# Patient Record
Sex: FEMALE | URBAN - METROPOLITAN AREA
[De-identification: names, ages, dates, MRNs, and addresses within clinical notes are randomized per-mention and may not be internally consistent; named-entity substitution may affect disease eponyms.]

---

## 2024-10-03 ENCOUNTER — CLAIMS CREATED FROM THE CLAIM WINDOW (OUTPATIENT)
Dept: URBAN - METROPOLITAN AREA MEDICAL CENTER 25 | Facility: MEDICAL CENTER | Age: 54
End: 2024-10-03
Payer: COMMERCIAL

## 2024-10-03 DIAGNOSIS — R93.3 ABN FINDINGS-GI TRACT: ICD-10-CM

## 2024-10-03 DIAGNOSIS — R11.0 NAUSEA: ICD-10-CM

## 2024-10-03 PROCEDURE — 99222 1ST HOSP IP/OBS MODERATE 55: CPT | Performed by: INTERNAL MEDICINE

## 2024-10-03 PROCEDURE — G8427 DOCREV CUR MEDS BY ELIG CLIN: HCPCS | Performed by: INTERNAL MEDICINE

## 2024-10-03 PROCEDURE — 99254 IP/OBS CNSLTJ NEW/EST MOD 60: CPT | Performed by: INTERNAL MEDICINE

## 2024-10-10 ENCOUNTER — LAB OUTSIDE AN ENCOUNTER (OUTPATIENT)
Dept: URBAN - METROPOLITAN AREA CLINIC 19 | Facility: CLINIC | Age: 54
End: 2024-10-10

## 2024-10-14 ENCOUNTER — OFFICE VISIT (OUTPATIENT)
Dept: URBAN - METROPOLITAN AREA CLINIC 19 | Facility: CLINIC | Age: 54
End: 2024-10-14

## 2024-10-14 RX ORDER — FLUTICASONE PROPIONATE 50 UG/1
SHAKE LIQUID AND USE 1 SPRAY IN EACH NOSTRIL DAILY SPRAY, METERED NASAL
Qty: 16 GRAM | Refills: 0 | Status: ON HOLD | COMMUNITY

## 2024-10-14 RX ORDER — FLUTICASONE PROPIONATE 50 UG/1
SPRAY, METERED NASAL
Qty: 16 MILLILITER | Refills: 0 | Status: ACTIVE | COMMUNITY

## 2024-10-14 RX ORDER — DOXYCYCLINE 100 MG/1
TAKE 1 CAPSULE BY MOUTH TWICE DAILY FOR 10 DAYS CAPSULE ORAL
Qty: 20 EACH | Refills: 0 | Status: ACTIVE | COMMUNITY

## 2024-10-14 RX ORDER — FLUTICASONE PROPIONATE 50 UG/1
SHAKE LIQUID AND USE 1 SPRAY IN EACH NOSTRIL DAILY SPRAY, METERED NASAL
Qty: 16 GRAM | Refills: 0 | Status: ACTIVE | COMMUNITY

## 2024-10-14 RX ORDER — OXYCODONE HYDROCHLORIDE 5 MG/1
TABLET ORAL
Qty: 12 TABLET | Refills: 0 | Status: ACTIVE | COMMUNITY

## 2024-10-14 RX ORDER — DICLOFENAC SODIUM 75 MG/1
TAKE 1 TABLET BY MOUTH 2 TIMES A DAY X14 DAYS TABLET, DELAYED RELEASE ORAL
Qty: 28 EACH | Refills: 0 | Status: ACTIVE | COMMUNITY

## 2024-10-14 RX ORDER — PREDNISONE 50 MG/1
TABLET ORAL
Qty: 5 TABLET | Refills: 0 | Status: ACTIVE | COMMUNITY

## 2024-10-14 RX ORDER — CEFDINIR 300 MG/1
CAPSULE ORAL
Qty: 20 CAPSULE | Refills: 0 | Status: ACTIVE | COMMUNITY

## 2024-10-14 RX ORDER — FLUTICASONE PROPIONATE 50 UG/1
SPRAY, METERED NASAL
Qty: 16 MILLILITER | Refills: 0 | Status: ON HOLD | COMMUNITY

## 2024-10-14 RX ORDER — AMOXICILLIN AND CLAVULANATE POTASSIUM 875; 125 MG/1; MG/1
TAKE 1 TABLET BY MOUTH EVERY 12 HOURS FOR 7 DAYS TABLET, FILM COATED ORAL
Qty: 14 EACH | Refills: 0 | Status: ACTIVE | COMMUNITY

## 2024-10-14 RX ORDER — ALBUTEROL SULFATE 90 UG/1
INHALE 2 PUFFS BY MOUTH EVERY 4 TO 6 HOURS WITH SPACER AEROSOL, METERED RESPIRATORY (INHALATION)
Qty: 8.5 GRAM | Refills: 0 | Status: ACTIVE | COMMUNITY

## 2024-10-14 RX ORDER — OXYCODONE HYDROCHLORIDE 5 MG/1
TAKE 1 TABLET BY MOUTH EVERY 4 HOURS AS NEEDED FOR MODERATE PAIN TABLET ORAL
Qty: 12 EACH | Refills: 0 | Status: ACTIVE | COMMUNITY

## 2024-10-14 RX ORDER — SACCHAROMYCES BOULARDII 50 MG
TAKE 1 CAPSULE BY MOUTH TWICE DAILY FOR 7 DAYS CAPSULE ORAL
Qty: 50 EACH | Refills: 0 | Status: ACTIVE | COMMUNITY

## 2024-10-14 RX ORDER — METHYLPREDNISOLONE 4 MG/1
FOLLOW PACKAGE DIRECTIONS TABLET ORAL
Qty: 21 EACH | Refills: 0 | Status: ACTIVE | COMMUNITY

## 2024-10-14 RX ORDER — SACCHAROMYCES BOULARDII 50 MG
CAPSULE ORAL
Qty: 50 CAPSULE | Refills: 0 | Status: ACTIVE | COMMUNITY

## 2024-10-14 RX ORDER — INHALER,ASSIST DEVICE,LG MASK
SPACER (EA) MISCELLANEOUS
Qty: 1 EACH | Refills: 0 | Status: ACTIVE | COMMUNITY

## 2024-10-14 RX ORDER — ONDANSETRON HYDROCHLORIDE 4 MG/1
TAKE 1 TABLET BY MOUTH EVERY 8 HOURS AS NEEDED FOR NAUSEA OR VOMITING TABLET, FILM COATED ORAL
Qty: 15 EACH | Refills: 0 | Status: ACTIVE | COMMUNITY

## 2024-10-14 RX ORDER — ONDANSETRON HYDROCHLORIDE 4 MG/1
TABLET, FILM COATED ORAL
Qty: 15 TABLET | Refills: 0 | Status: ACTIVE | COMMUNITY

## 2024-10-14 RX ORDER — AMOXICILLIN AND CLAVULANATE POTASSIUM 875; 125 MG/1; MG/1
TABLET, FILM COATED ORAL
Qty: 14 TABLET | Refills: 0 | Status: ACTIVE | COMMUNITY

## 2024-10-14 RX ORDER — METHOCARBAMOL 750 MG/1
TAKE 1 TABLET BY MOUTH 3 TIMES A DAY FOR 5 DAYS TABLET, FILM COATED ORAL
Qty: 15 EACH | Refills: 0 | Status: ACTIVE | COMMUNITY

## 2024-10-14 RX ORDER — LEVOTHYROXINE SODIUM 0.05 MG/1
TAKE 1 TABLET BY MOUTH DAILY TABLET ORAL
Qty: 90 EACH | Refills: 1 | Status: ACTIVE | COMMUNITY

## 2024-10-14 RX ORDER — PANTOPRAZOLE SODIUM 40 MG/1
TABLET, DELAYED RELEASE ORAL
Qty: 60 TABLET | Refills: 0 | Status: ACTIVE | COMMUNITY

## 2024-10-14 RX ORDER — NITROFURANTOIN MONOHYDRATE/MACROCRYSTALLINE 25; 75 MG/1; MG/1
TAKE 1 CAPSULE BY MOUTH TWICE DAILY CAPSULE ORAL
Qty: 14 EACH | Refills: 0 | Status: ACTIVE | COMMUNITY

## 2024-10-14 RX ORDER — TRIAMTERENE AND HYDROCHLOROTHIAZIDE 37.5; 25 MG/1; MG/1
CAPSULE ORAL
Qty: 90 CAPSULE | Refills: 0 | Status: ACTIVE | COMMUNITY

## 2024-10-14 NOTE — HPI-TODAY'S VISIT:
54-year-old female with PMH of thyroid disease, and asthma presents today for hospital follow-up.  She was admitted to [facility] from 10/2/2024-10/4/24 with complaints of near syncope with associated nausea but no vomiting.  Stool culture positive for E. coli.  Her CT of the abdomen and pelvis noted sigmoidal diverticulitis as well as distal gastric wall thickening suspicious for peptic ulcer disease.  She was recommended to take pantoprazole 40 mg twice a day for 2 months for her gastric wall thickening which could represent PUD and given Augmentin x  **** NEED D/C SUMMARY***  Patient denies any chronic alcohol, tobacco or NSAID use.  She does admit eating "really spicy foods"  10/3/2024 labs: Normal CBC, low BUN 6, albumin 4.9 otherwise unremarkable CMP.

## 2024-10-15 ENCOUNTER — LAB OUTSIDE AN ENCOUNTER (OUTPATIENT)
Dept: URBAN - METROPOLITAN AREA CLINIC 19 | Facility: CLINIC | Age: 54
End: 2024-10-15

## 2024-10-17 ENCOUNTER — LAB OUTSIDE AN ENCOUNTER (OUTPATIENT)
Dept: URBAN - METROPOLITAN AREA CLINIC 19 | Facility: CLINIC | Age: 54
End: 2024-10-17

## 2024-10-17 ENCOUNTER — DASHBOARD ENCOUNTERS (OUTPATIENT)
Age: 54
End: 2024-10-17

## 2024-10-17 ENCOUNTER — OFFICE VISIT (OUTPATIENT)
Dept: URBAN - METROPOLITAN AREA CLINIC 19 | Facility: CLINIC | Age: 54
End: 2024-10-17
Payer: COMMERCIAL

## 2024-10-17 VITALS
WEIGHT: 229.8 LBS | HEART RATE: 72 BPM | TEMPERATURE: 97.5 F | SYSTOLIC BLOOD PRESSURE: 110 MMHG | DIASTOLIC BLOOD PRESSURE: 78 MMHG | BODY MASS INDEX: 36.93 KG/M2 | HEIGHT: 66 IN

## 2024-10-17 DIAGNOSIS — K31.89 GASTRIC WALL THICKENING: ICD-10-CM

## 2024-10-17 DIAGNOSIS — K57.92 DIVERTICULITIS: ICD-10-CM

## 2024-10-17 DIAGNOSIS — Z09 HOSPITAL DISCHARGE FOLLOW-UP: ICD-10-CM

## 2024-10-17 PROCEDURE — 99203 OFFICE O/P NEW LOW 30 MIN: CPT

## 2024-10-17 RX ORDER — AMOXICILLIN AND CLAVULANATE POTASSIUM 875; 125 MG/1; MG/1
TABLET, FILM COATED ORAL
Qty: 14 TABLET | Refills: 0 | Status: DISCONTINUED | COMMUNITY

## 2024-10-17 RX ORDER — PANTOPRAZOLE SODIUM 40 MG/1
TABLET, DELAYED RELEASE ORAL
Qty: 60 TABLET | Refills: 0 | Status: ACTIVE | COMMUNITY

## 2024-10-17 RX ORDER — SACCHAROMYCES BOULARDII 50 MG
CAPSULE ORAL
Qty: 50 CAPSULE | Refills: 0 | Status: DISCONTINUED | COMMUNITY

## 2024-10-17 RX ORDER — NITROFURANTOIN MONOHYDRATE/MACROCRYSTALLINE 25; 75 MG/1; MG/1
TAKE 1 CAPSULE BY MOUTH TWICE DAILY CAPSULE ORAL
Qty: 14 EACH | Refills: 0 | Status: ACTIVE | COMMUNITY

## 2024-10-17 RX ORDER — METHOCARBAMOL 750 MG/1
TAKE 1 TABLET BY MOUTH 3 TIMES A DAY FOR 5 DAYS TABLET, FILM COATED ORAL
Qty: 15 EACH | Refills: 0 | Status: ACTIVE | COMMUNITY

## 2024-10-17 RX ORDER — FLUTICASONE PROPIONATE 50 UG/1
SPRAY, METERED NASAL
Qty: 16 MILLILITER | Refills: 0 | Status: DISCONTINUED | COMMUNITY

## 2024-10-17 RX ORDER — LEVOTHYROXINE SODIUM 0.05 MG/1
TAKE 1 TABLET BY MOUTH DAILY TABLET ORAL
Qty: 90 EACH | Refills: 1 | Status: ACTIVE | COMMUNITY

## 2024-10-17 RX ORDER — SACCHAROMYCES BOULARDII 50 MG
TAKE 1 CAPSULE BY MOUTH TWICE DAILY FOR 7 DAYS CAPSULE ORAL
Qty: 50 EACH | Refills: 0 | Status: DISCONTINUED | COMMUNITY

## 2024-10-17 RX ORDER — ONDANSETRON HYDROCHLORIDE 4 MG/1
TAKE 1 TABLET BY MOUTH EVERY 8 HOURS AS NEEDED FOR NAUSEA OR VOMITING TABLET, FILM COATED ORAL
Qty: 15 EACH | Refills: 0 | Status: DISCONTINUED | COMMUNITY

## 2024-10-17 RX ORDER — OXYCODONE HYDROCHLORIDE 5 MG/1
TABLET ORAL
Qty: 12 TABLET | Refills: 0 | Status: DISCONTINUED | COMMUNITY

## 2024-10-17 RX ORDER — DOXYCYCLINE 100 MG/1
TAKE 1 CAPSULE BY MOUTH TWICE DAILY FOR 10 DAYS CAPSULE ORAL
Qty: 20 EACH | Refills: 0 | Status: DISCONTINUED | COMMUNITY

## 2024-10-17 RX ORDER — DICLOFENAC SODIUM 75 MG/1
TAKE 1 TABLET BY MOUTH 2 TIMES A DAY X14 DAYS TABLET, DELAYED RELEASE ORAL
Qty: 28 EACH | Refills: 0 | Status: DISCONTINUED | COMMUNITY

## 2024-10-17 RX ORDER — FLUTICASONE PROPIONATE 50 UG/1
SHAKE LIQUID AND USE 1 SPRAY IN EACH NOSTRIL DAILY SPRAY, METERED NASAL
Qty: 16 GRAM | Refills: 0 | Status: DISCONTINUED | COMMUNITY

## 2024-10-17 RX ORDER — PREDNISONE 50 MG/1
TABLET ORAL
Qty: 5 TABLET | Refills: 0 | Status: DISCONTINUED | COMMUNITY

## 2024-10-17 RX ORDER — INHALER,ASSIST DEVICE,LG MASK
SPACER (EA) MISCELLANEOUS
Qty: 1 EACH | Refills: 0 | Status: DISCONTINUED | COMMUNITY

## 2024-10-17 RX ORDER — AMOXICILLIN AND CLAVULANATE POTASSIUM 875; 125 MG/1; MG/1
TAKE 1 TABLET BY MOUTH EVERY 12 HOURS FOR 7 DAYS TABLET, FILM COATED ORAL
Qty: 14 EACH | Refills: 0 | Status: DISCONTINUED | COMMUNITY

## 2024-10-17 RX ORDER — OXYCODONE HYDROCHLORIDE 5 MG/1
TAKE 1 TABLET BY MOUTH EVERY 4 HOURS AS NEEDED FOR MODERATE PAIN TABLET ORAL
Qty: 12 EACH | Refills: 0 | Status: DISCONTINUED | COMMUNITY

## 2024-10-17 RX ORDER — TRIAMTERENE AND HYDROCHLOROTHIAZIDE 37.5; 25 MG/1; MG/1
CAPSULE ORAL
Qty: 90 CAPSULE | Refills: 0 | Status: ACTIVE | COMMUNITY

## 2024-10-17 RX ORDER — FLUTICASONE PROPIONATE 50 UG/1
SPRAY, METERED NASAL
Qty: 16 MILLILITER | Refills: 0 | Status: ON HOLD | COMMUNITY

## 2024-10-17 RX ORDER — ONDANSETRON HYDROCHLORIDE 4 MG/1
TABLET, FILM COATED ORAL
Qty: 15 TABLET | Refills: 0 | Status: DISCONTINUED | COMMUNITY

## 2024-10-17 RX ORDER — CEFDINIR 300 MG/1
CAPSULE ORAL
Qty: 20 CAPSULE | Refills: 0 | Status: DISCONTINUED | COMMUNITY

## 2024-10-17 RX ORDER — METHYLPREDNISOLONE 4 MG/1
FOLLOW PACKAGE DIRECTIONS TABLET ORAL
Qty: 21 EACH | Refills: 0 | Status: DISCONTINUED | COMMUNITY

## 2024-10-17 RX ORDER — FLUTICASONE PROPIONATE 50 UG/1
SHAKE LIQUID AND USE 1 SPRAY IN EACH NOSTRIL DAILY SPRAY, METERED NASAL
Qty: 16 GRAM | Refills: 0 | Status: ON HOLD | COMMUNITY

## 2024-10-17 RX ORDER — ALBUTEROL SULFATE 90 UG/1
INHALE 2 PUFFS BY MOUTH EVERY 4 TO 6 HOURS WITH SPACER AEROSOL, METERED RESPIRATORY (INHALATION)
Qty: 8.5 GRAM | Refills: 0 | Status: ACTIVE | COMMUNITY

## 2024-10-17 NOTE — HPI-TODAY'S VISIT:
64-year-old female with PMH of asthma, thyroid disease presents today for hospital follow-up.  She was seen at [facility] on 10/2/2024 after a near syncope episode in the shower as well as symptoms of possible UTI.  Stool culture was positive for E. coli.  Her CT of the abdomen and pelvis noting sigmoidal diverticulitis as well as distal gastric wall thickening suspicious for peptic ulcer disease.  She was recommended to take Augmentin for 2 weeks and to perform a colonoscopy in 2 months.  Regarding her gastric wall thickening she was recommended to take Protonix 40 mg twice daily and performing an EGD in 2 months for further evaluation.  Today, she reports having no abdominal pain. She is having formed stools since this morning and is done with her antibiotics. Has been eating a low fiber diet. Denies heart, lung and kidneys problems  10/3/2024 labs: Normal CBC with differential, low BUN 6, albumin 4.9, normal hemoglobin A1c

## 2024-10-18 LAB
A/G RATIO: 1.7
ABSOLUTE BASOPHILS: 53
ABSOLUTE EOSINOPHILS: 101
ABSOLUTE LYMPHOCYTES: 1958
ABSOLUTE MONOCYTES: 355
ABSOLUTE NEUTROPHILS: 2333
ALBUMIN: 4.5
ALKALINE PHOSPHATASE: 75
ALT (SGPT): 33
AST (SGOT): 23
BASOPHILS: 1.1
BILIRUBIN, TOTAL: 0.4
BUN/CREATININE RATIO: (no result)
BUN: 14
CALCIUM: 10
CARBON DIOXIDE, TOTAL: 32
CHLORIDE: 97
CREATININE: 1.02
EGFR: 65
EOSINOPHILS: 2.1
GLOBULIN, TOTAL: 2.6
GLUCOSE: 95
HEMATOCRIT: 44.9
HEMOGLOBIN: 14.4
LYMPHOCYTES: 40.8
MCH: 28.2
MCHC: 32.1
MCV: 87.9
MONOCYTES: 7.4
MPV: 11.2
NEUTROPHILS: 48.6
PLATELET COUNT: 279
POTASSIUM: 4
PROTEIN, TOTAL: 7.1
RDW: 13.2
RED BLOOD CELL COUNT: 5.11
SODIUM: 138
WHITE BLOOD CELL COUNT: 4.8

## 2024-12-11 ENCOUNTER — CLAIMS CREATED FROM THE CLAIM WINDOW (OUTPATIENT)
Dept: URBAN - METROPOLITAN AREA SURGERY CENTER 31 | Facility: SURGERY CENTER | Age: 54
End: 2024-12-11
Payer: COMMERCIAL

## 2024-12-11 ENCOUNTER — CLAIMS CREATED FROM THE CLAIM WINDOW (OUTPATIENT)
Dept: URBAN - METROPOLITAN AREA CLINIC 4 | Facility: CLINIC | Age: 54
End: 2024-12-11
Payer: COMMERCIAL

## 2024-12-11 DIAGNOSIS — K29.60 ADENOPAPILLOMATOSIS GASTRICA: ICD-10-CM

## 2024-12-11 DIAGNOSIS — K31.89 ACQUIRED DEFORMITY OF DUODENUM: ICD-10-CM

## 2024-12-11 DIAGNOSIS — K31.89 OTHER DISEASES OF STOMACH AND DUODENUM: ICD-10-CM

## 2024-12-11 DIAGNOSIS — D12.4 ADENOMA OF DESCENDING COLON: ICD-10-CM

## 2024-12-11 DIAGNOSIS — K29.70 GASTRITIS, UNSPECIFIED, WITHOUT BLEEDING: ICD-10-CM

## 2024-12-11 DIAGNOSIS — D12.4 BENIGN NEOPLASM OF DESCENDING COLON: ICD-10-CM

## 2024-12-11 DIAGNOSIS — D12.4 ADENOMATOUS POLYP OF DESCENDING COLON: ICD-10-CM

## 2024-12-11 DIAGNOSIS — R93.3 ABN FINDINGS-GI TRACT: ICD-10-CM

## 2024-12-11 DIAGNOSIS — K29.70 CHRONIC GASTRITIS: ICD-10-CM

## 2024-12-11 PROCEDURE — 45385 COLONOSCOPY W/LESION REMOVAL: CPT | Performed by: INTERNAL MEDICINE

## 2024-12-11 PROCEDURE — 00813 ANES UPR LWR GI NDSC PX: CPT | Performed by: NURSE ANESTHETIST, CERTIFIED REGISTERED

## 2024-12-11 PROCEDURE — 88305 TISSUE EXAM BY PATHOLOGIST: CPT | Performed by: PATHOLOGY

## 2024-12-11 PROCEDURE — 88312 SPECIAL STAINS GROUP 1: CPT | Performed by: PATHOLOGY

## 2024-12-11 PROCEDURE — 43239 EGD BIOPSY SINGLE/MULTIPLE: CPT | Performed by: INTERNAL MEDICINE

## 2024-12-11 RX ORDER — ALBUTEROL SULFATE 90 UG/1
INHALE 2 PUFFS BY MOUTH EVERY 4 TO 6 HOURS WITH SPACER AEROSOL, METERED RESPIRATORY (INHALATION)
Qty: 8.5 GRAM | Refills: 0 | Status: ACTIVE | COMMUNITY

## 2024-12-11 RX ORDER — PANTOPRAZOLE SODIUM 40 MG/1
TABLET, DELAYED RELEASE ORAL
Qty: 60 TABLET | Refills: 0 | Status: ACTIVE | COMMUNITY

## 2024-12-11 RX ORDER — TRIAMTERENE AND HYDROCHLOROTHIAZIDE 37.5; 25 MG/1; MG/1
CAPSULE ORAL
Qty: 90 CAPSULE | Refills: 0 | Status: ACTIVE | COMMUNITY

## 2024-12-11 RX ORDER — LEVOTHYROXINE SODIUM 0.05 MG/1
TAKE 1 TABLET BY MOUTH DAILY TABLET ORAL
Qty: 90 EACH | Refills: 1 | Status: ACTIVE | COMMUNITY

## 2024-12-11 RX ORDER — NITROFURANTOIN MONOHYDRATE/MACROCRYSTALLINE 25; 75 MG/1; MG/1
TAKE 1 CAPSULE BY MOUTH TWICE DAILY CAPSULE ORAL
Qty: 14 EACH | Refills: 0 | Status: ACTIVE | COMMUNITY

## 2024-12-11 RX ORDER — FLUTICASONE PROPIONATE 50 UG/1
SPRAY, METERED NASAL
Qty: 16 MILLILITER | Refills: 0 | Status: ON HOLD | COMMUNITY

## 2024-12-11 RX ORDER — FLUTICASONE PROPIONATE 50 UG/1
SHAKE LIQUID AND USE 1 SPRAY IN EACH NOSTRIL DAILY SPRAY, METERED NASAL
Qty: 16 GRAM | Refills: 0 | Status: ON HOLD | COMMUNITY

## 2024-12-11 RX ORDER — METHOCARBAMOL 750 MG/1
TAKE 1 TABLET BY MOUTH 3 TIMES A DAY FOR 5 DAYS TABLET, FILM COATED ORAL
Qty: 15 EACH | Refills: 0 | Status: ACTIVE | COMMUNITY

## 2025-01-08 ENCOUNTER — OFFICE VISIT (OUTPATIENT)
Dept: URBAN - METROPOLITAN AREA CLINIC 19 | Facility: CLINIC | Age: 55
End: 2025-01-08
Payer: COMMERCIAL

## 2025-01-08 VITALS
OXYGEN SATURATION: 98 % | DIASTOLIC BLOOD PRESSURE: 91 MMHG | HEIGHT: 66 IN | SYSTOLIC BLOOD PRESSURE: 143 MMHG | HEART RATE: 74 BPM | TEMPERATURE: 97.7 F

## 2025-01-08 DIAGNOSIS — K29.70 GASTRITIS: ICD-10-CM

## 2025-01-08 DIAGNOSIS — Z87.19 HISTORY OF DIVERTICULITIS: ICD-10-CM

## 2025-01-08 DIAGNOSIS — D36.9 TUBULAR ADENOMA: ICD-10-CM

## 2025-01-08 DIAGNOSIS — R74.01 ELEVATED ALT MEASUREMENT: ICD-10-CM

## 2025-01-08 PROCEDURE — 99213 OFFICE O/P EST LOW 20 MIN: CPT

## 2025-01-08 RX ORDER — OMEPRAZOLE 20 MG/1
1 CAPSULE 1/2 TO 1 HOUR BEFORE MORNING MEAL CAPSULE, DELAYED RELEASE ORAL ONCE A DAY
OUTPATIENT

## 2025-01-08 RX ORDER — ALBUTEROL SULFATE 90 UG/1
INHALE 2 PUFFS BY MOUTH EVERY 4 TO 6 HOURS WITH SPACER AEROSOL, METERED RESPIRATORY (INHALATION)
Qty: 8.5 GRAM | Refills: 0 | Status: ACTIVE | COMMUNITY

## 2025-01-08 RX ORDER — METHOCARBAMOL 750 MG/1
TAKE 1 TABLET BY MOUTH 3 TIMES A DAY FOR 5 DAYS TABLET, FILM COATED ORAL
Qty: 15 EACH | Refills: 0 | Status: ACTIVE | COMMUNITY

## 2025-01-08 RX ORDER — TRIAMTERENE AND HYDROCHLOROTHIAZIDE 37.5; 25 MG/1; MG/1
CAPSULE ORAL
Qty: 90 CAPSULE | Refills: 0 | Status: ACTIVE | COMMUNITY

## 2025-01-08 RX ORDER — PANTOPRAZOLE SODIUM 40 MG/1
TABLET, DELAYED RELEASE ORAL
Qty: 60 TABLET | Refills: 0 | Status: DISCONTINUED | COMMUNITY

## 2025-01-08 RX ORDER — FLUTICASONE PROPIONATE 50 UG/1
SPRAY, METERED NASAL
Qty: 16 MILLILITER | Refills: 0 | Status: ON HOLD | COMMUNITY

## 2025-01-08 RX ORDER — NITROFURANTOIN MONOHYDRATE/MACROCRYSTALLINE 25; 75 MG/1; MG/1
TAKE 1 CAPSULE BY MOUTH TWICE DAILY CAPSULE ORAL
Qty: 14 EACH | Refills: 0 | Status: ACTIVE | COMMUNITY

## 2025-01-08 RX ORDER — FAMOTIDINE 40 MG/1
1 TABLET TABLET, FILM COATED ORAL ONCE A DAY
Qty: 90 TABLET | Refills: 5 | OUTPATIENT
Start: 2025-01-08

## 2025-01-08 RX ORDER — OMEPRAZOLE 20 MG/1
1 CAPSULE 1/2 TO 1 HOUR BEFORE MORNING MEAL CAPSULE, DELAYED RELEASE ORAL ONCE A DAY
Status: ACTIVE | COMMUNITY

## 2025-01-08 RX ORDER — FLUTICASONE PROPIONATE 50 UG/1
SHAKE LIQUID AND USE 1 SPRAY IN EACH NOSTRIL DAILY SPRAY, METERED NASAL
Qty: 16 GRAM | Refills: 0 | Status: ON HOLD | COMMUNITY

## 2025-01-08 RX ORDER — LEVOTHYROXINE SODIUM 0.05 MG/1
TAKE 1 TABLET BY MOUTH DAILY TABLET ORAL
Qty: 90 EACH | Refills: 1 | Status: ACTIVE | COMMUNITY

## 2025-01-08 NOTE — HPI-TODAY'S VISIT:
Mrs. Aguilar returns today for follow-up after EGD and colonoscopy.  I saw her on 10/17/2024 as hospital follow-up where she was found with gastric wall thickening and diverticulitis.  On 12/11/2024 her EGD with Dr. Ho noted a normal esophagus, erythematous mucosa in the stomach and normal examined duodenum.  Stomach biopsy showed chronic gastritis, no evidence for H. pylori, intestinal metaplasia, dysplasia or malignancy.  Duodenal biopsy with no significant abnormality. Colonoscopy: 1 tubular adenoma polyp removed from the descending colon, diverticulosis in the sigmoid colon and internal hemorrhoids were found.  Recommended to repeat in 5 years (2029)  She is feeling tired - her daughter is hospitalized with pneumonia. She is having a BM every two days. She is currently managing her heartburn with Omeprazole.  Previous history summarized below: [facility] after a near syncope episode in the shower as well as symptoms of possible UTI. Stool culture was positive for E. coli. Her CT of the abdomen and pelvis noting sigmoidal diverticulitis as well as distal gastric wall thickening suspicious for peptic ulcer disease. She was recommended to take Augmentin for 2 weeks and to perform a colonoscopy in 2 months. 10/3/2024 labs: Normal CBC with differential, low BUN 6, albumin 4.9, normal hemoglobin A1c.

## 2025-01-17 LAB
A/G RATIO: 1.9
ALBUMIN: 4.6
ALKALINE PHOSPHATASE: 101
ALT (SGPT): 21
AST (SGOT): 19
BILIRUBIN, TOTAL: 0.4
BUN/CREATININE RATIO: (no result)
BUN: 18
CALCIUM: 9.7
CARBON DIOXIDE, TOTAL: 29
CHLORIDE: 105
CREATININE: 0.83
EGFR: 83
GLOBULIN, TOTAL: 2.4
GLUCOSE: 72
POTASSIUM: 4.2
PROTEIN, TOTAL: 7
SODIUM: 141